# Patient Record
Sex: FEMALE | URBAN - METROPOLITAN AREA
[De-identification: names, ages, dates, MRNs, and addresses within clinical notes are randomized per-mention and may not be internally consistent; named-entity substitution may affect disease eponyms.]

---

## 2022-09-28 ENCOUNTER — OFFICE VISIT (OUTPATIENT)
Dept: INTERNAL MEDICINE CLINIC | Age: 27
End: 2022-09-28
Payer: MEDICAID

## 2022-09-28 VITALS
RESPIRATION RATE: 20 BRPM | HEART RATE: 81 BPM | DIASTOLIC BLOOD PRESSURE: 84 MMHG | TEMPERATURE: 97.2 F | HEIGHT: 64 IN | BODY MASS INDEX: 31.76 KG/M2 | SYSTOLIC BLOOD PRESSURE: 123 MMHG | WEIGHT: 186 LBS | OXYGEN SATURATION: 100 %

## 2022-09-28 DIAGNOSIS — Z00.00 ENCOUNTER FOR MEDICAL EXAMINATION TO ESTABLISH CARE: ICD-10-CM

## 2022-09-28 DIAGNOSIS — G43.909 MIGRAINE WITHOUT STATUS MIGRAINOSUS, NOT INTRACTABLE, UNSPECIFIED MIGRAINE TYPE: Primary | ICD-10-CM

## 2022-09-28 PROCEDURE — 99203 OFFICE O/P NEW LOW 30 MIN: CPT | Performed by: NURSE PRACTITIONER

## 2022-09-28 RX ORDER — BISMUTH SUBSALICYLATE 262 MG
1 TABLET,CHEWABLE ORAL DAILY
COMMUNITY

## 2022-09-28 NOTE — PROGRESS NOTES
Radha Turner is a 24 y.o. female (: 1995) presenting to address:    Chief Complaint   Patient presents with    New Patient    Establish Care    Migraine       Vitals:    22 0736   BP: 138/88   Pulse: 81   Resp: 20   Temp: 97.2 °F (36.2 °C)   TempSrc: Temporal   SpO2: 100%   Weight: 186 lb (84.4 kg)   Height: 5' 3.98\" (1.625 m)   PainSc:   0 - No pain   LMP: 2022       Hearing/Vision:   No results found. Learning Assessment:   No flowsheet data found. Depression Screening:     3 most recent PHQ Screens 2022   Little interest or pleasure in doing things Not at all   Feeling down, depressed, irritable, or hopeless Not at all   Total Score PHQ 2 0     Fall Risk Assessment:   No flowsheet data found. Abuse Screening:   No flowsheet data found. Coordination of Care Questionaire:     Advanced Directive:   1. Do you have an Advanced Directive? NO    2. Would you like information on Advanced Directives? NO    1. \"Have you been to the ER, urgent care clinic since your last visit? Hospitalized since your last visit? \" No    2. \"Have you seen or consulted any other health care providers outside of the [address] [place] [address] since your last visit? \" No     3. For patients aged 39-70: Has the patient had a colonoscopy? NA - based on age     If the patient is female:    4. For patients aged 41-77: Has the patient had a mammogram within the past 2 years? NA - based on age    11. For patients aged 21-65: Has the patient had a pap smear?  Yes - no Care Gap present

## 2022-09-28 NOTE — PROGRESS NOTES
HISTORY OF PRESENT ILLNESS  Fernando Caldwell is a 24 y.o. female. Here to establish care with migraines   Migraine   The history is provided by the Patient. This is a chronic problem. The current episode started more than 1 week ago (since age of 17). Episode frequency: daily. The problem has been gradually worsening. The headache is aggravated by the menstrual cycle, loud noise and bright light. The pain is located in the Temporal and frontal region. The quality of the pain is described as sharp and throbbing. The pain is at a severity of 10/10. The pain is severe. Pertinent negatives include no chest pressure, no palpitations, no shortness of breath, no weakness, no dizziness, no visual change, no nausea and no vomiting. She has tried NSAIDs for the symptoms. The treatment provided no relief. /84   Pulse 81   Temp 97.2 °F (36.2 °C) (Temporal)   Resp 20   Ht 5' 3.98\" (1.625 m)   Wt 186 lb (84.4 kg)   LMP 09/28/2022 (Exact Date)   SpO2 100%   BMI 31.95 kg/m²   Current Outpatient Medications   Medication Sig Dispense Refill    multivitamin (ONE A DAY) tablet Take 1 Tablet by mouth daily. Review of Systems   Respiratory:  Negative for shortness of breath. Cardiovascular:  Negative for palpitations. Gastrointestinal:  Negative for nausea and vomiting. Neurological:  Negative for dizziness and weakness. Physical Exam  Vitals and nursing note reviewed. Constitutional:       General: She is not in acute distress. Appearance: She is obese. She is not ill-appearing. HENT:      Head: Normocephalic. Right Ear: Tympanic membrane, ear canal and external ear normal.      Left Ear: Tympanic membrane, ear canal and external ear normal.      Nose: Nose normal.      Mouth/Throat:      Mouth: Mucous membranes are moist.      Pharynx: Oropharynx is clear. Eyes:      General: No scleral icterus. Extraocular Movements: Extraocular movements intact.       Conjunctiva/sclera: Conjunctivae normal.      Pupils: Pupils are equal, round, and reactive to light. Cardiovascular:      Rate and Rhythm: Normal rate and regular rhythm. Pulses: Normal pulses. Heart sounds: Normal heart sounds. Pulmonary:      Effort: Pulmonary effort is normal. No respiratory distress. Breath sounds: Normal breath sounds. No wheezing. Abdominal:      General: Abdomen is flat. Bowel sounds are normal. There is no distension. Palpations: Abdomen is soft. Tenderness: There is no abdominal tenderness. There is no guarding or rebound. Musculoskeletal:         General: Normal range of motion. Cervical back: Normal range of motion. Right lower leg: No edema. Left lower leg: No edema. Lymphadenopathy:      Cervical: No cervical adenopathy. Skin:     General: Skin is warm and dry. Findings: No lesion or rash. Neurological:      General: No focal deficit present. Mental Status: She is alert and oriented to person, place, and time. Psychiatric:         Mood and Affect: Mood normal.         Behavior: Behavior normal.         Thought Content: Thought content normal.         Judgment: Judgment normal.       ASSESSMENT and PLAN  Diagnoses and all orders for this visit:    1. Migraine without status migrainosus, not intractable, unspecified migraine type  -     REFERRAL TO NEUROLOGY    2. Encounter for medical examination to establish care  -     CBC WITH AUTOMATED DIFF; Future  -     METABOLIC PANEL, COMPREHENSIVE; Future  -     HEMOGLOBIN A1C WITH EAG; Future  -     LIPID PANEL; Future  -     T4, FREE; Future  -     TSH 3RD GENERATION; Future  -     URINALYSIS W/ RFLX MICROSCOPIC; Future  -     VITAMIN D, 25 HYDROXY; Future  Migraines continue NSAIDS currently and referral to reestablish with new neurology   Labs for baseline   Follow-up and Dispositions    Return in about 1 year (around 9/28/2023) for CPE.

## 2022-09-29 LAB
25(OH)D3 SERPL-MCNC: 31.6 NG/ML (ref 32–100)
A-G RATIO,AGRAT: 1.9 RATIO (ref 1.1–2.6)
ABSOLUTE LYMPHOCYTE COUNT, 10803: 1.8 K/UL (ref 1–4.8)
ALBUMIN SERPL-MCNC: 4.6 G/DL (ref 3.5–5)
ALP SERPL-CCNC: 110 U/L (ref 25–115)
ALT SERPL-CCNC: 13 U/L (ref 5–40)
ANION GAP SERPL CALC-SCNC: 16 MMOL/L (ref 3–15)
AST SERPL W P-5'-P-CCNC: 18 U/L (ref 10–37)
AVG GLU, 10930: 114 MG/DL (ref 91–123)
BACTERIA,BACTU: NEGATIVE
BASOPHILS # BLD: 0 K/UL (ref 0–0.2)
BASOPHILS NFR BLD: 0 % (ref 0–2)
BILIRUB SERPL-MCNC: 0.4 MG/DL (ref 0.2–1.2)
BILIRUB UR QL: NEGATIVE
BUN SERPL-MCNC: 12 MG/DL (ref 6–22)
CALCIUM SERPL-MCNC: 9.8 MG/DL (ref 8.4–10.5)
CHLORIDE SERPL-SCNC: 100 MMOL/L (ref 98–110)
CHOLEST SERPL-MCNC: 189 MG/DL (ref 110–200)
CLARITY: CLEAR
CO2 SERPL-SCNC: 25 MMOL/L (ref 20–32)
COLOR UR: YELLOW
CREAT SERPL-MCNC: 0.7 MG/DL (ref 0.5–1.2)
EOSINOPHIL # BLD: 0.1 K/UL (ref 0–0.5)
EOSINOPHIL NFR BLD: 1 % (ref 0–6)
EPITHELIAL,EPSU: ABNORMAL /HPF (ref 0–2)
ERYTHROCYTE [DISTWIDTH] IN BLOOD BY AUTOMATED COUNT: 15.5 % (ref 10–15.5)
GLOBULIN,GLOB: 2.4 G/DL (ref 2–4)
GLOMERULAR FILTRATION RATE: >60 ML/MIN/1.73 SQ.M.
GLUCOSE SERPL-MCNC: 70 MG/DL (ref 70–99)
GLUCOSE UR QL: NEGATIVE MG/DL
GRANULOCYTES,GRANS: 63 % (ref 40–75)
HBA1C MFR BLD HPLC: 5.6 % (ref 4.8–5.6)
HCT VFR BLD AUTO: 43.9 % (ref 35.1–46.5)
HDLC SERPL-MCNC: 3.3 MG/DL (ref 0–5)
HDLC SERPL-MCNC: 58 MG/DL
HGB BLD-MCNC: 13.1 G/DL (ref 11.7–15.5)
HGB UR QL STRIP: ABNORMAL
HYLINE CAST, 6014: ABNORMAL /LPF (ref 0–2)
KETONES UR QL STRIP.AUTO: NEGATIVE MG/DL
LDL/HDL RATIO,LDHD: 2.1
LDLC SERPL CALC-MCNC: 122 MG/DL (ref 50–99)
LEUKOCYTE ESTERASE: NEGATIVE
LYMPHOCYTES, LYMLT: 25 % (ref 20–45)
MCH RBC QN AUTO: 25 PG (ref 26–34)
MCHC RBC AUTO-ENTMCNC: 30 G/DL (ref 31–36)
MCV RBC AUTO: 85 FL (ref 80–99)
MONOCYTES # BLD: 0.8 K/UL (ref 0.1–1)
MONOCYTES NFR BLD: 10 % (ref 3–12)
NEUTROPHILS # BLD AUTO: 4.5 K/UL (ref 1.8–7.7)
NITRITE UR QL STRIP.AUTO: NEGATIVE
NON-HDL CHOLESTEROL, 011976: 131 MG/DL
PH UR STRIP: 6 PH (ref 5–8)
PLATELET # BLD AUTO: 264 K/UL (ref 140–440)
PMV BLD AUTO: 11.2 FL (ref 9–13)
POTASSIUM SERPL-SCNC: 4.3 MMOL/L (ref 3.5–5.5)
PROT SERPL-MCNC: 7 G/DL (ref 6.4–8.3)
PROT UR QL STRIP: NEGATIVE MG/DL
RBC # BLD AUTO: 5.17 M/UL (ref 3.8–5.2)
RBC #/AREA URNS HPF: ABNORMAL /HPF
SODIUM SERPL-SCNC: 141 MMOL/L (ref 133–145)
SP GR UR: 1.02 (ref 1–1.03)
T4 FREE SERPL-MCNC: 1.1 NG/DL (ref 0.9–1.8)
TRIGL SERPL-MCNC: 44 MG/DL (ref 40–149)
TSH SERPL DL<=0.005 MIU/L-ACNC: 0.57 MCU/ML (ref 0.27–4.2)
UROBILINOGEN UR STRIP-MCNC: 0.2 MG/DL
VLDLC SERPL CALC-MCNC: 9 MG/DL (ref 8–30)
WBC # BLD AUTO: 7.2 K/UL (ref 4–11)
WBC URNS QL MICRO: ABNORMAL /HPF (ref 0–5)

## 2023-02-14 DIAGNOSIS — G43.909 MIGRAINE WITHOUT STATUS MIGRAINOSUS, NOT INTRACTABLE, UNSPECIFIED MIGRAINE TYPE: Primary | ICD-10-CM

## 2023-03-29 ENCOUNTER — OFFICE VISIT (OUTPATIENT)
Age: 28
End: 2023-03-29

## 2023-03-29 VITALS
OXYGEN SATURATION: 96 % | WEIGHT: 192.2 LBS | SYSTOLIC BLOOD PRESSURE: 124 MMHG | RESPIRATION RATE: 18 BRPM | DIASTOLIC BLOOD PRESSURE: 80 MMHG | HEIGHT: 65 IN | HEART RATE: 74 BPM | BODY MASS INDEX: 32.02 KG/M2

## 2023-03-29 DIAGNOSIS — G43.019 INTRACTABLE MIGRAINE WITHOUT AURA AND WITHOUT STATUS MIGRAINOSUS: Primary | ICD-10-CM

## 2023-03-29 PROCEDURE — 99204 OFFICE O/P NEW MOD 45 MIN: CPT | Performed by: STUDENT IN AN ORGANIZED HEALTH CARE EDUCATION/TRAINING PROGRAM

## 2023-03-29 PROCEDURE — 99213 OFFICE O/P EST LOW 20 MIN: CPT | Performed by: STUDENT IN AN ORGANIZED HEALTH CARE EDUCATION/TRAINING PROGRAM

## 2023-03-29 RX ORDER — SUMATRIPTAN 50 MG/1
50 TABLET, FILM COATED ORAL
Qty: 9 TABLET | Refills: 3 | Status: SHIPPED | OUTPATIENT
Start: 2023-03-29 | End: 2023-03-29

## 2023-03-29 RX ORDER — ACETAMINOPHEN, ASPIRIN AND CAFFEINE 250; 250; 65 MG/1; MG/1; MG/1
1 TABLET, FILM COATED ORAL EVERY 6 HOURS PRN
COMMUNITY

## 2023-03-29 RX ORDER — TOPIRAMATE 25 MG/1
25 TABLET ORAL 2 TIMES DAILY
Qty: 60 TABLET | Refills: 3 | Status: SHIPPED | OUTPATIENT
Start: 2023-03-29

## 2023-03-29 ASSESSMENT — ENCOUNTER SYMPTOMS
COUGH: 0
VOMITING: 0
NAUSEA: 1
BACK PAIN: 1
SHORTNESS OF BREATH: 0

## 2023-03-29 NOTE — PROGRESS NOTES
movements intact. Pupils: Pupils are equal, round, and reactive to light. Pulmonary:      Effort: Pulmonary effort is normal. No respiratory distress. Musculoskeletal:         General: Normal range of motion. Cervical back: Normal range of motion and neck supple. Right lower leg: No edema. Left lower leg: No edema. Neurological:      Mental Status: She is alert. Comments: Mental status: Awake, alert, oriented , follows simple and complex commands, no neglect, no extinction. Speech and languge: fluent, coherent, and comprehension intact  CN: Funduscopy showed clear disc margins; VFF, EOMI, PERRLA, face sensation intact , no facial asymmetry noted, palate elevation symmetric bilat, SS+SCM 5/5 bilat, tongue midline  Motor: no pronator drift, tone normal throughout, strength 5/5 throughout  Sensory: intact to light touch and PP  throughout  Coordination: FNF accurate w/o dysmetria  DTR: 2+ throughout. Gait: Normal.         No visits with results within 3 Month(s) from this visit. Latest known visit with results is:   Office Visit on 09/28/2022   Component Date Value Ref Range Status    Vit D, 25-Hydroxy 09/28/2022 31.6 (A)  32.0 - 100.0 ng/mL Final    T4 Free 09/28/2022 1.1  0.9 - 1.8 ng/dL Final    Triglycerides 09/28/2022 44  40 - 149 mg/dL Final    HDL 09/28/2022 58  >=40 mg/dL Final    Cholesterol, Total 09/28/2022 189  110 - 200 mg/dL Final    HDL 09/28/2022 3.3  0.0 - 5.0 NA Final    Non-HDL Cholesterol 09/28/2022 131 (A)  <130 mg/dL Final    LDL Calculated 09/28/2022 122 (A)  50 - 99 mg/dL Final    VLDL Cholesterol Calculated 09/28/2022 9  8 - 30 mg/dL Final    LDL/HDL Ratio 09/28/2022 2.1  NA Final    Comment: Test includes cholesterol, HDL cholesterol, triglycerides and LDL.     Cholesterol Recommended NCEP guidelines in mg/dL:    Less than 200            Desirable  200 - 239                Borderline High  Greater than or  = 240   High      Please Note:  Total

## 2023-07-27 ENCOUNTER — OFFICE VISIT (OUTPATIENT)
Age: 28
End: 2023-07-27
Payer: MEDICAID

## 2023-07-27 VITALS
SYSTOLIC BLOOD PRESSURE: 134 MMHG | DIASTOLIC BLOOD PRESSURE: 80 MMHG | HEART RATE: 68 BPM | BODY MASS INDEX: 32.49 KG/M2 | RESPIRATION RATE: 18 BRPM | OXYGEN SATURATION: 99 % | WEIGHT: 195 LBS | HEIGHT: 65 IN

## 2023-07-27 DIAGNOSIS — G43.019 INTRACTABLE MIGRAINE WITHOUT AURA AND WITHOUT STATUS MIGRAINOSUS: Primary | ICD-10-CM

## 2023-07-27 PROCEDURE — 99214 OFFICE O/P EST MOD 30 MIN: CPT | Performed by: STUDENT IN AN ORGANIZED HEALTH CARE EDUCATION/TRAINING PROGRAM

## 2023-07-27 RX ORDER — TOPIRAMATE 25 MG/1
25 TABLET ORAL 2 TIMES DAILY
Qty: 60 TABLET | Refills: 4 | Status: SHIPPED | OUTPATIENT
Start: 2023-07-27

## 2023-07-27 RX ORDER — RIZATRIPTAN BENZOATE 10 MG/1
10 TABLET ORAL
Qty: 10 TABLET | Refills: 4 | Status: SHIPPED | OUTPATIENT
Start: 2023-07-27 | End: 2023-07-27

## 2023-07-27 ASSESSMENT — ENCOUNTER SYMPTOMS
NAUSEA: 1
SHORTNESS OF BREATH: 0
COUGH: 0
VOMITING: 0
BACK PAIN: 1

## 2023-07-27 NOTE — PROGRESS NOTES
John Gee is a 34 y.o. female . presents for Migraine and Follow-up  A 34years old female patient here for follow-up of  migraine headache. Last seen in the clinic in March 2023. She was started on topiramate 25 mg p.o. twice daily; for acute attacks sumatriptan was given. She only took the topiramate once a day for about 2 weeks and stopped it; also not taking the sumatriptan and mentioned that she had a worsening feeling where she is taking it. She prefers to take the Excedrin. She currently has headaches almost every day. She states she takes Excedrin almost every day in the morning; otherwise headaches might get worse. She usually wakes up with headache in the morning. If she takes Excedrin, cannot function. Headache might last all day if she does not take the Excedrin. Might have nausea and vomiting occasionally. From initial encounter:  A 24years old female patient referred here for evaluation of headache. She has been having headaches since the age of around 17. Frequency has gotten worse. Currently has about 3 headaches per week. Headache might last all day. She takes Excedrin up to 3 tablets; it helps. But she has to lay down in a dark place. Headaches are mostly bilateral; can sometimes be unilateral.  Are severe. Associated with nausea. No vomiting. Has photophobia and phonophobia. Movement worsens the headache. She might feel dizzy with migraine headaches. No aura. Triggers include bright lights, loud noises, stress, missing a meal.  She states coffee helps; she drinks about 3 cups a week. Currently attending classes for her masters degree. Also working from home. About 4 or 5 years ago, had seen neurologist.  Used to take ibuprofen 800; she said was not helping. She was also prescribed a monthly injection for prevention; she did not take it. Never been on oral preventatives. No weakness of her extremities. No sensory symptoms.   Had a CT scan of the brain from

## 2023-09-27 ENCOUNTER — OFFICE VISIT (OUTPATIENT)
Facility: CLINIC | Age: 28
End: 2023-09-27
Payer: MEDICAID

## 2023-09-27 VITALS
BODY MASS INDEX: 31.82 KG/M2 | DIASTOLIC BLOOD PRESSURE: 85 MMHG | HEART RATE: 59 BPM | SYSTOLIC BLOOD PRESSURE: 126 MMHG | WEIGHT: 191 LBS | HEIGHT: 65 IN | RESPIRATION RATE: 18 BRPM | OXYGEN SATURATION: 99 %

## 2023-09-27 DIAGNOSIS — Z76.89 ESTABLISHING CARE WITH NEW DOCTOR, ENCOUNTER FOR: ICD-10-CM

## 2023-09-27 DIAGNOSIS — M25.521 RIGHT ELBOW PAIN: ICD-10-CM

## 2023-09-27 DIAGNOSIS — Z11.4 SCREENING FOR HIV (HUMAN IMMUNODEFICIENCY VIRUS): ICD-10-CM

## 2023-09-27 DIAGNOSIS — E78.00 ELEVATED LDL CHOLESTEROL LEVEL: Primary | ICD-10-CM

## 2023-09-27 DIAGNOSIS — E55.9 VITAMIN D DEFICIENCY: ICD-10-CM

## 2023-09-27 DIAGNOSIS — Z11.59 ENCOUNTER FOR HEPATITIS C SCREENING TEST FOR LOW RISK PATIENT: ICD-10-CM

## 2023-09-27 PROCEDURE — 99203 OFFICE O/P NEW LOW 30 MIN: CPT | Performed by: INTERNAL MEDICINE

## 2023-09-27 ASSESSMENT — PATIENT HEALTH QUESTIONNAIRE - PHQ9
SUM OF ALL RESPONSES TO PHQ QUESTIONS 1-9: 0
SUM OF ALL RESPONSES TO PHQ9 QUESTIONS 1 & 2: 0
SUM OF ALL RESPONSES TO PHQ QUESTIONS 1-9: 0
2. FEELING DOWN, DEPRESSED OR HOPELESS: 0
SUM OF ALL RESPONSES TO PHQ QUESTIONS 1-9: 0
1. LITTLE INTEREST OR PLEASURE IN DOING THINGS: 0
SUM OF ALL RESPONSES TO PHQ QUESTIONS 1-9: 0

## 2023-09-27 ASSESSMENT — ENCOUNTER SYMPTOMS
COUGH: 0
CONSTIPATION: 0
DIARRHEA: 0
ABDOMINAL PAIN: 0
SHORTNESS OF BREATH: 0

## 2023-09-27 NOTE — PROGRESS NOTES
ROOM # 8  Identified pt with two pt identifiers(name and ). Reviewed record in preparation for visit and have obtained necessary documentation. Chief Complaint   Patient presents with    New Patient      Niki Montano preferred language for health care discussion is english/other. Is the patient using any DME equipment during OV? no    Niki Montano is due for:  Health Maintenance Due   Topic    Hepatitis B vaccine (1 of 3 - 3-dose series)    COVID-19 Vaccine (1)    Varicella vaccine (1 of 2 - 2-dose childhood series)    HPV vaccine (1 - 2-dose series)    HIV screen     Hepatitis C screen     Pap smear     Depression Screen        Health Maintenance reviewed and discussed per provider  Please order/place referral if appropriate. 1. For patients aged 43-73: Has the patient had a colonoscopy? no  If the patient is female:    2. For patients aged 43-66: Has the patient had a mammogram within the past 2 years? N/A    3. For patients aged 21-65: Has the patient had a pap smear? no    Advance Directive:  1. Do you have an advance directive in place? Patient Reply:no    2. If not, would you like material regarding how to put one in place? Patient Reply:no    Coordination of Care:  1. Have you been to the ER, urgent care clinic since your last visit? Hospitalized since your last visit? Patient Reply:no    2. Have you seen or consulted any other health care providers outside of the [address] [place] [address] since your last visit? Include any pap smears or colon. Patient Reply:no    Patient is accompanied by  I have received verbal consent from Niki Montano to discuss any/all medical information while they are present in the room.     Depression Screenin/27/2023    10:26 AM   PHQ-9    Little interest or pleasure in doing things 0   Feeling down, depressed, or hopeless 0   PHQ-2 Score 0   PHQ-9 Total Score 0           Recent Travel Screening and Travel History documentation     Travel Screening

## 2023-09-28 ENCOUNTER — OFFICE VISIT (OUTPATIENT)
Age: 28
End: 2023-09-28

## 2023-09-28 VITALS — WEIGHT: 190 LBS | HEIGHT: 65 IN | TEMPERATURE: 97 F | BODY MASS INDEX: 31.65 KG/M2

## 2023-09-28 DIAGNOSIS — M25.521 RIGHT ELBOW PAIN: Primary | ICD-10-CM

## 2023-09-28 DIAGNOSIS — S53.401A: ICD-10-CM

## 2023-09-28 PROBLEM — R11.10 NON-INTRACTABLE VOMITING: Status: ACTIVE | Noted: 2020-06-19

## 2023-09-28 LAB
CHOLESTEROL/HDL RATIO: 3.4 (ref 0–5)
CHOLESTEROL: 161 MG/DL (ref 110–200)
HDLC SERPL-MCNC: 48 MG/DL
HEPATITIS C ANTIBODY: NORMAL
HIV -1/0/2 AG/AB WITH REFLEX: NON REACTIVE
HIV INTERPRETATION: NORMAL
LDL CHOLESTEROL CALCULATED: 101 MG/DL (ref 50–99)
LDL/HDL RATIO: 2.1
NON-HDL CHOLESTEROL: 113 MG/DL
TRIGL SERPL-MCNC: 59 MG/DL (ref 40–149)
VITAMIN D 25-HYDROXY: 17 NG/ML (ref 32–100)
VLDLC SERPL CALC-MCNC: 12 MG/DL (ref 8–30)

## 2023-09-28 NOTE — PROGRESS NOTES
Jed Bragg  1995   Chief Complaint   Patient presents with    Elbow Pain     Right, frequent popping, pain radiates to hand, intermittent numbness and tingling at night. No injury, no sx. HISTORY OF PRESENT ILLNESS  Jed Bragg is a 34 y.o. female who presents today for evaluation of right elbow pain. Pain is a 6/10. Pain has been present for year. Has tried following treatments: Injections:No; Brace:No; Therapy:No; Cane/Crutch:No    Ms. Peggy Gottron reports that her elbow has been in pain for about a year. She reports that the pain is an ache. She states that it feels as if she hit her funny bone and it never stopped hurting. 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 No Known Allergies     Past Medical History:   Diagnosis Date    Migraine     PCOS (polycystic ovarian syndrome)       Social History       Tobacco History       Smoking Status  Never      Smokeless Tobacco Use  Never              Alcohol History       Alcohol Use Status  Yes              Drug Use       Drug Use Status  Never              Sexual Activity       Sexually Active  Not Asked Birth Control/Protection  None                   No past surgical history on file. No family history on file. Current Outpatient Medications   Medication Sig    rizatriptan (MAXALT) 10 MG tablet Take 1 tablet by mouth once as needed for Migraine May repeat in 2 hours if needed; not ot be taken more than 2 days a week    topiramate (TOPAMAX) 25 MG tablet Take 1 tablet by mouth 2 times daily (Patient not taking: Reported on 9/27/2023)    aspirin-acetaminophen-caffeine (EXCEDRIN MIGRAINE) 250-250-65 MG per tablet Take 1 tablet by mouth every 6 hours as needed for Headaches (Patient not taking: Reported on 9/27/2023)     No current facility-administered medications for this visit.        REVIEW OF SYSTEM   Patient denies: Weight loss, Fever/Chills, HA, Visual changes, Fatigue, Chest pain, SOB, Abdominal pain,

## 2023-09-29 DIAGNOSIS — E55.9 VITAMIN D DEFICIENCY: Primary | ICD-10-CM

## 2023-09-29 RX ORDER — ERGOCALCIFEROL 1.25 MG/1
50000 CAPSULE ORAL WEEKLY
Qty: 10 CAPSULE | Refills: 0 | Status: SHIPPED | OUTPATIENT
Start: 2023-09-29 | End: 2023-12-02

## 2023-09-29 RX ORDER — MELATONIN
1000 DAILY
Qty: 90 TABLET | Refills: 2 | Status: SHIPPED | OUTPATIENT
Start: 2023-12-02

## 2023-12-27 ENCOUNTER — OFFICE VISIT (OUTPATIENT)
Facility: CLINIC | Age: 28
End: 2023-12-27
Payer: MEDICAID

## 2023-12-27 VITALS
OXYGEN SATURATION: 97 % | HEIGHT: 65 IN | RESPIRATION RATE: 20 BRPM | TEMPERATURE: 96.9 F | BODY MASS INDEX: 31.32 KG/M2 | WEIGHT: 188 LBS | DIASTOLIC BLOOD PRESSURE: 80 MMHG | SYSTOLIC BLOOD PRESSURE: 127 MMHG | HEART RATE: 75 BPM

## 2023-12-27 DIAGNOSIS — L73.9 FOLLICULITIS: Primary | ICD-10-CM

## 2023-12-27 DIAGNOSIS — L29.9 ITCH OF SKIN: ICD-10-CM

## 2023-12-27 PROCEDURE — 99213 OFFICE O/P EST LOW 20 MIN: CPT | Performed by: INTERNAL MEDICINE

## 2023-12-27 RX ORDER — LORATADINE 10 MG/1
10 TABLET ORAL DAILY PRN
Qty: 30 TABLET | Refills: 0 | Status: SHIPPED | OUTPATIENT
Start: 2023-12-27 | End: 2024-01-26

## 2023-12-27 RX ORDER — IBUPROFEN 800 MG/1
800 TABLET ORAL EVERY 6 HOURS PRN
COMMUNITY

## 2023-12-27 RX ORDER — DOXYCYCLINE HYCLATE 100 MG
100 TABLET ORAL 2 TIMES DAILY
Qty: 14 TABLET | Refills: 0 | Status: SHIPPED | OUTPATIENT
Start: 2023-12-27 | End: 2024-01-06

## 2023-12-27 NOTE — PROGRESS NOTES
1. \"Have you been to the ER, urgent care clinic since your last visit? Hospitalized since your last visit? \" No    2. \"Have you seen or consulted any other health care providers outside of the [address] [place] [address] since your last visit? \" Yes    3. For patients aged 43-73: Has the patient had a colonoscopy / FIT/ Cologuard? NA - based on age      If the patient is female:    4. For patients aged 43-66: Has the patient had a mammogram within the past 2 years? NA - based on age or sex      11. For patients aged 21-65: Has the patient had a pap smear?  No

## 2023-12-28 RX ORDER — LORATADINE 10 MG/1
TABLET ORAL
Qty: 90 TABLET | OUTPATIENT
Start: 2023-12-28

## 2024-01-04 ENCOUNTER — TELEPHONE (OUTPATIENT)
Facility: CLINIC | Age: 29
End: 2024-01-04

## 2024-01-04 NOTE — TELEPHONE ENCOUNTER
----- Message from Magalys Ramos sent at 1/4/2024  9:10 AM EST -----  Subject: Message to Provider    QUESTIONS  Information for Provider? pt would like for Dr. Rebecca Bravo to know that   he misdiagnosed her & she went the emergency dept who informed her that   she actually had contact dermatitis. pt also stated that the medication   that Dr. Bravo provided her made it worse. This is the reason why she has   decided to cancel her appnt for 01.04.24  ---------------------------------------------------------------------------  --------------  CALL BACK INFO  [phone]; OK to leave message on voicemail  ---------------------------------------------------------------------------  --------------  SCRIPT ANSWERS  Relationship to Patient? Self

## 2024-01-30 DIAGNOSIS — E55.9 VITAMIN D DEFICIENCY: ICD-10-CM

## 2024-01-30 RX ORDER — ERGOCALCIFEROL 1.25 MG/1
CAPSULE ORAL
Qty: 10 CAPSULE | Refills: 0 | Status: SHIPPED | OUTPATIENT
Start: 2024-01-30

## 2024-11-05 ENCOUNTER — OFFICE VISIT (OUTPATIENT)
Facility: CLINIC | Age: 29
End: 2024-11-05

## 2024-11-05 VITALS
OXYGEN SATURATION: 98 % | BODY MASS INDEX: 32.89 KG/M2 | HEIGHT: 65 IN | RESPIRATION RATE: 16 BRPM | WEIGHT: 197.4 LBS | TEMPERATURE: 97.1 F | SYSTOLIC BLOOD PRESSURE: 136 MMHG | HEART RATE: 71 BPM | DIASTOLIC BLOOD PRESSURE: 86 MMHG

## 2024-11-05 DIAGNOSIS — B96.89 ACUTE BACTERIAL TONSILLITIS: Primary | ICD-10-CM

## 2024-11-05 DIAGNOSIS — J03.80 ACUTE BACTERIAL TONSILLITIS: Primary | ICD-10-CM

## 2024-11-05 DIAGNOSIS — J35.1 TONSILLAR HYPERTROPHY: ICD-10-CM

## 2024-11-05 RX ORDER — IBUPROFEN 600 MG/1
600 TABLET, FILM COATED ORAL 2 TIMES DAILY PRN
Qty: 20 TABLET | Refills: 0 | Status: SHIPPED | OUTPATIENT
Start: 2024-11-05

## 2024-11-05 SDOH — ECONOMIC STABILITY: FOOD INSECURITY: WITHIN THE PAST 12 MONTHS, THE FOOD YOU BOUGHT JUST DIDN'T LAST AND YOU DIDN'T HAVE MONEY TO GET MORE.: NEVER TRUE

## 2024-11-05 SDOH — ECONOMIC STABILITY: FOOD INSECURITY: WITHIN THE PAST 12 MONTHS, YOU WORRIED THAT YOUR FOOD WOULD RUN OUT BEFORE YOU GOT MONEY TO BUY MORE.: NEVER TRUE

## 2024-11-05 SDOH — ECONOMIC STABILITY: INCOME INSECURITY: HOW HARD IS IT FOR YOU TO PAY FOR THE VERY BASICS LIKE FOOD, HOUSING, MEDICAL CARE, AND HEATING?: NOT HARD AT ALL

## 2024-11-05 ASSESSMENT — PATIENT HEALTH QUESTIONNAIRE - PHQ9
SUM OF ALL RESPONSES TO PHQ QUESTIONS 1-9: 0
SUM OF ALL RESPONSES TO PHQ9 QUESTIONS 1 & 2: 0
2. FEELING DOWN, DEPRESSED OR HOPELESS: NOT AT ALL
SUM OF ALL RESPONSES TO PHQ QUESTIONS 1-9: 0
SUM OF ALL RESPONSES TO PHQ QUESTIONS 1-9: 0
1. LITTLE INTEREST OR PLEASURE IN DOING THINGS: NOT AT ALL
SUM OF ALL RESPONSES TO PHQ QUESTIONS 1-9: 0

## 2024-11-05 ASSESSMENT — ENCOUNTER SYMPTOMS
URTICARIA: 1
SHORTNESS OF BREATH: 0

## 2024-11-05 NOTE — PROGRESS NOTES
Subjective:      Patient ID: Naveed Turner is a 29 y.o. female.    Acute visit    Patient history going to the ED on 10/28/2024 for sore throat.  She reported strep test positive, she was given amoxicillin 500 mg tid for 10 days.  She has not completed yet, today is day #8.  She feels a lot better, no fever chills but is still having uncomfortable sensation in the throat, on physical exam his tonsils are hypertrophied.  Presence of tonsil stones, tonsil hypertrophy grade III/IV.  I recommended evaluation by ENT for possible tonsillectomy and since she has a history of recurrent inflammation of the tonsils and tonsils are hypertrophied.  She will complete her antibiotic with amoxicillin, if no resolution of symptoms she will come back and we will discuss other treatments.  She was taking Tylenol but I recommended Advil due to the anti-inflammatory properties.  She will take Advil twice a day as needed.  With meals, good hydration.  ENT contact information provided.  She will call make an appointment.  Follow-up in 3 months for physical or as needed before if symptoms persist.  Patient understood agree with the plan.        Migraine w/ aura  rizatriptan (MAXALT) 10 MG tablet prn  opiramate (TOPAMAX) 25 MG tablet, bid (not using)  aspirin-acetaminophen-caffeine (EXCEDRIN MIGRAINE) - Stopped.    Elevated LDL cholesterol  LDL cholesterol very mildly elevated.  Patient made lifestyle changes including better diet, low-fat blood sugars.    Urticaria  Pertinent negatives include no fever or shortness of breath.       Review of Systems   Constitutional:  Negative for fever.   Respiratory:  Negative for shortness of breath.        Objective:   Visit Vitals  /86 (Site: Right Upper Arm)   Pulse 71   Temp 97.1 °F (36.2 °C) (Temporal)   Resp 16   Ht 1.651 m (5' 5\")   Wt 89.5 kg (197 lb 6.4 oz)   SpO2 98%   BMI 32.85 kg/m²        Physical Exam  HENT:      Mouth/Throat:      Comments: Tonsillar hypertrophy grade